# Patient Record
Sex: FEMALE | Race: WHITE | NOT HISPANIC OR LATINO | URBAN - METROPOLITAN AREA
[De-identification: names, ages, dates, MRNs, and addresses within clinical notes are randomized per-mention and may not be internally consistent; named-entity substitution may affect disease eponyms.]

---

## 2018-04-18 ENCOUNTER — IMPORTED ENCOUNTER (OUTPATIENT)
Dept: URBAN - METROPOLITAN AREA CLINIC 50 | Facility: CLINIC | Age: 68
End: 2018-04-18

## 2018-04-25 ENCOUNTER — IMPORTED ENCOUNTER (OUTPATIENT)
Dept: URBAN - METROPOLITAN AREA CLINIC 50 | Facility: CLINIC | Age: 68
End: 2018-04-25

## 2018-05-09 ENCOUNTER — IMPORTED ENCOUNTER (OUTPATIENT)
Dept: URBAN - METROPOLITAN AREA CLINIC 50 | Facility: CLINIC | Age: 68
End: 2018-05-09

## 2018-05-18 ENCOUNTER — IMPORTED ENCOUNTER (OUTPATIENT)
Dept: URBAN - METROPOLITAN AREA CLINIC 50 | Facility: CLINIC | Age: 68
End: 2018-05-18

## 2018-09-07 ENCOUNTER — IMPORTED ENCOUNTER (OUTPATIENT)
Dept: URBAN - METROPOLITAN AREA CLINIC 50 | Facility: CLINIC | Age: 68
End: 2018-09-07

## 2019-08-14 ENCOUNTER — IMPORTED ENCOUNTER (OUTPATIENT)
Dept: URBAN - METROPOLITAN AREA CLINIC 50 | Facility: CLINIC | Age: 69
End: 2019-08-14

## 2019-09-04 ENCOUNTER — IMPORTED ENCOUNTER (OUTPATIENT)
Dept: URBAN - METROPOLITAN AREA CLINIC 50 | Facility: CLINIC | Age: 69
End: 2019-09-04

## 2019-09-11 ENCOUNTER — IMPORTED ENCOUNTER (OUTPATIENT)
Dept: URBAN - METROPOLITAN AREA CLINIC 50 | Facility: CLINIC | Age: 69
End: 2019-09-11

## 2020-10-28 NOTE — PATIENT DISCUSSION
"""Type 2 Diabetic eye exam with dilation. No diabetic retinopathy found OS. Recommend annual dilated examinations. Patient instructed to call office immediately if sudden changes in vision occur. Emphasized importance of good blood glucose control. Summary of care provided to the physician managing the ongoing diabetes care. """

## 2021-03-18 ENCOUNTER — IMPORTED ENCOUNTER (OUTPATIENT)
Dept: URBAN - METROPOLITAN AREA CLINIC 50 | Facility: CLINIC | Age: 71
End: 2021-03-18

## 2021-03-18 NOTE — PATIENT DISCUSSION
"""Discussed dry eye diagnosis with patient.  Educated patient on proper lid hygiene and stressed importance of lid massages and the use of warm compresses and artificial tears."" ""Start Preservative free tears both eyes 4-6x/day ."" ""Start Warm compresses both eyes twice a day

## 2021-04-17 ASSESSMENT — VISUAL ACUITY
OD_SC: 20/25-1
OS_SC: 20/200
OD_SC: 20/40
OD_CC: J1+
OS_CC: J1+ (-)
OS_SC: 20/60
OS_PH: 20/40
OS_PH: 20/50
OS_CC: 20/80+
OD_CC: J1+ (-)
OD_CC: J4
OD_CC: 20/30+2
OS_CC: J4
OD_PH: 20/30-2
OS_CC: J1+
OS_SC: 20/100
OD_SC: 20/25+
OS_SC: 20/70
OS_CC: 20/100-2
OS_SC: 20/60-
OD_CC: 20/40
OD_SC: 20/25-1
OD_SC: 20/30
OS_PH: 20/30-1
OS_PH: 20/40

## 2021-04-17 ASSESSMENT — TONOMETRY
OD_IOP_MMHG: 16
OD_IOP_MMHG: 12
OD_IOP_MMHG: 17
OS_IOP_MMHG: 14
OS_IOP_MMHG: 17
OS_IOP_MMHG: 16
OD_IOP_MMHG: 14
OS_IOP_MMHG: 16
OS_IOP_MMHG: 11
OD_IOP_MMHG: 15
OS_IOP_MMHG: 14
OS_IOP_MMHG: 14
OD_IOP_MMHG: 14
OD_IOP_MMHG: 18

## 2021-09-09 NOTE — PATIENT DISCUSSION
"""Type 2 Diabetic eye exam with dilation. No diabetic retinopathy found OS. Recommend annual dilated examinations. Patient instructed to call office immediately if sudden changes in vision occur. Emphasized importance of good blood glucose control. Summary of care provided to the physician managing the ongoing diabetes care. ""."

## 2021-09-10 NOTE — PATIENT DISCUSSION
Allergy drops discussed with patient today. Drop brand names given to patient. Advised to not start drops at this time.

## 2021-09-10 NOTE — PATIENT DISCUSSION
Start warm compresses OU BID. Start lid scrubs OU BID. Start Maxitrol OU BID RX called into pharmacy.

## 2021-09-10 NOTE — PATIENT DISCUSSION
Drop regiment discussed with patient. Start PF tears OU 3-4x/day. Start warm compresses OU BID. Start lid scrubs OU BID.

## 2021-10-11 ENCOUNTER — MOTILITY CHECK (OUTPATIENT)
Dept: URBAN - METROPOLITAN AREA CLINIC 53 | Facility: CLINIC | Age: 71
End: 2021-10-11

## 2021-10-11 DIAGNOSIS — H53.2: ICD-10-CM

## 2021-10-11 DIAGNOSIS — H52.4: ICD-10-CM

## 2021-10-11 PROCEDURE — 92060 SENSORIMOTOR EXAMINATION: CPT

## 2021-10-11 PROCEDURE — 92015 DETERMINE REFRACTIVE STATE: CPT

## 2021-10-11 ASSESSMENT — VISUAL ACUITY
OU_SC: J1+
OD_SC: 20/20
OS_SC: 20/40-2
OU_SC: 20/25+2

## 2022-01-07 NOTE — PATIENT DISCUSSION
Cataract(s) are not yet visually significant to the patient. Recommend monitoring, and patient agrees with this plan.  Discussed CE/IOL as soon as pt feels ready secondary to h/o angle closure.

## 2022-01-07 NOTE — PATIENT DISCUSSION
PATENT PI OU.  RECOMMEND PT MONITOR W/ CFS OD EVERY 6 MONTHS.  ADVISED TO CONSIDER CE/IOL AS SOON AS PT FEELS READY TO FULLY OPEN ANGLES.  DO NOT DILATE UNLESS NECESSARY.

## 2022-04-12 ENCOUNTER — COMPREHENSIVE EXAM (OUTPATIENT)
Dept: URBAN - METROPOLITAN AREA CLINIC 48 | Facility: CLINIC | Age: 72
End: 2022-04-12

## 2022-04-12 DIAGNOSIS — H53.2: ICD-10-CM

## 2022-04-12 DIAGNOSIS — H04.123: ICD-10-CM

## 2022-04-12 DIAGNOSIS — H35.373: ICD-10-CM

## 2022-04-12 DIAGNOSIS — H43.813: ICD-10-CM

## 2022-04-12 DIAGNOSIS — H52.4: ICD-10-CM

## 2022-04-12 PROCEDURE — 92014 COMPRE OPH EXAM EST PT 1/>: CPT

## 2022-04-12 RX ORDER — LIFITEGRAST 50 MG/ML: 1 SOLUTION/ DROPS OPHTHALMIC TWICE A DAY

## 2022-04-12 ASSESSMENT — VISUAL ACUITY
OD_CC: 20/20 SLOW
OS_CC: 20/20 BLURRY
OS_SC: J1 @16IN
OD_SC: J3 @16IN

## 2022-04-12 ASSESSMENT — TONOMETRY
OS_IOP_MMHG: 18
OD_IOP_MMHG: 18

## 2022-04-12 NOTE — PATIENT DISCUSSION
Patient interested in progressive glasses with no prism correction; given Rx from 10/11/2021 visit with no prism correction.

## 2022-04-12 NOTE — PATIENT DISCUSSION
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 Diplopia with and without specs.

## 2022-04-12 NOTE — PATIENT DISCUSSION
Ander Noel: Patient counseled as to delayed onset effect of [employer]. Discussed onset of action may take 2-3 months to achieve maximum effect. Reviewed with patient mild degree of irritation and burning with onset of usage. Patient verbalizes understanding and wishes to start medication. Will e-prescribe medication to patient's pharmacy and follow up to determine effect.

## 2023-04-03 ENCOUNTER — CONTACT LENSES/GLASSES VISIT (OUTPATIENT)
Dept: URBAN - METROPOLITAN AREA CLINIC 48 | Facility: LOCATION | Age: 73
End: 2023-04-03

## 2023-04-03 DIAGNOSIS — H53.2: ICD-10-CM

## 2023-04-03 PROCEDURE — 92060 SENSORIMOTOR EXAMINATION: CPT

## 2023-04-03 ASSESSMENT — VISUAL ACUITY
OU_SC: 20/25-1
OS_SC: 20/50-2
OD_SC: 20/25
OS_PH: 20/20-1

## 2023-08-14 ENCOUNTER — COMPREHENSIVE EXAM (OUTPATIENT)
Dept: URBAN - METROPOLITAN AREA CLINIC 48 | Facility: LOCATION | Age: 73
End: 2023-08-14

## 2023-08-14 DIAGNOSIS — H53.2: ICD-10-CM

## 2023-08-14 DIAGNOSIS — H04.123: ICD-10-CM

## 2023-08-14 DIAGNOSIS — H35.361: ICD-10-CM

## 2023-08-14 DIAGNOSIS — H02.831: ICD-10-CM

## 2023-08-14 DIAGNOSIS — H35.373: ICD-10-CM

## 2023-08-14 DIAGNOSIS — H43.813: ICD-10-CM

## 2023-08-14 PROCEDURE — 92014 COMPRE OPH EXAM EST PT 1/>: CPT

## 2023-08-14 PROCEDURE — 92134 CPTRZ OPH DX IMG PST SGM RTA: CPT

## 2023-08-14 ASSESSMENT — KERATOMETRY
OS_AXISANGLE_DEGREES: 160
OS_K2POWER_DIOPTERS: 44.75
OD_AXISANGLE2_DEGREES: 100
OS_K1POWER_DIOPTERS: 43.75
OD_AXISANGLE_DEGREES: 010
OD_K1POWER_DIOPTERS: 43.75
OD_K2POWER_DIOPTERS: 44.25
OS_AXISANGLE2_DEGREES: 70

## 2023-08-14 ASSESSMENT — VISUAL ACUITY
OD_SC: 20/25
OU_SC: J2
OS_SC: 20/60
OS_PH: 20/40

## 2023-08-14 ASSESSMENT — TONOMETRY
OS_IOP_MMHG: 17
OD_IOP_MMHG: 17